# Patient Record
Sex: FEMALE
[De-identification: names, ages, dates, MRNs, and addresses within clinical notes are randomized per-mention and may not be internally consistent; named-entity substitution may affect disease eponyms.]

---

## 2018-08-27 ENCOUNTER — HOSPITAL ENCOUNTER (EMERGENCY)
Dept: HOSPITAL 31 - C.ER | Age: 53
Discharge: HOME | End: 2018-08-27
Payer: COMMERCIAL

## 2018-08-27 VITALS
TEMPERATURE: 98.7 F | DIASTOLIC BLOOD PRESSURE: 66 MMHG | OXYGEN SATURATION: 99 % | SYSTOLIC BLOOD PRESSURE: 130 MMHG | HEART RATE: 86 BPM | RESPIRATION RATE: 18 BRPM

## 2018-08-27 VITALS — BODY MASS INDEX: 26.1 KG/M2

## 2018-08-27 DIAGNOSIS — R21: Primary | ICD-10-CM

## 2018-08-27 NOTE — C.PDOC
History Of Present Illness


54 y/o female with PMH HTN presents to ED with c/o throat pain for 7 days. 

Patient also c/o rash to arms for 5 days. PT notes that she is a  

and is frequently using cleaning products and comes in contact with plants 

which she believes may have caused the rash. Occasionally uses gloves. No known 

allergens- no medication, foods or location. No one else has a similar rash. 

Denies fever, tongue/lip swelling, cough, sob or any other complaints at this 

time. 


Time Seen by Provider: 08/27/18 11:27


Chief Complaint (Nursing): Abnormal Skin Integrity


History Per: Patient, 


History/Exam Limitations: no limitations


Onset/Duration Of Symptoms: Days


Current Symptoms Are (Timing): Still Present


Quality Of Symptoms: Itching





Past Medical History


Reviewed: Historical Data, Nursing Documentation, Vital Signs


Vital Signs: 


 Last Vital Signs











Temp  98.7 F   08/27/18 11:23


 


Pulse  86   08/27/18 11:23


 


Resp  18   08/27/18 11:23


 


BP  130/66   08/27/18 11:23


 


Pulse Ox  99   08/27/18 12:21














- Medical History


PMH: HTN


Surgical History: No Surg Hx


Family History: States: No Known Family Hx





- Social History


Hx Alcohol Use: No


Hx Substance Use: No





- Immunization History


Hx Tetanus Toxoid Vaccination: No


Hx Influenza Vaccination: No


Hx Pneumococcal Vaccination: No





Review Of Systems


Constitutional: Negative for: Fever, Chills


ENT: Positive for: Throat Pain


Cardiovascular: Negative for: Chest Pain


Respiratory: Negative for: Cough, Shortness of Breath


Skin: Positive for: Rash





Physical Exam





- Physical Exam


Appears: Well, Non-toxic, No Acute Distress


Skin: Warm, Dry, Rash ((+) erythematous macular rash with some scaling on 

dorsal aspect of wrist and parts of her hands)


Head: Atraumatic, Normacephalic


Eye(s): bilateral: Normal Inspection, EOMI


Ear(s): Bilateral: Normal


Nose: Normal


Oral Mucosa: Moist


Tongue: No Swelling


Lips: No Swelling


Throat: Normal, No Erythema, No Exudate, No Drooling


Neck: Normal ROM, Supple


Chest: Symmetrical


Cardiovascular: Rhythm Regular


Respiratory: Normal Breath Sounds, No Accessory Muscle Use, No Rales, No Rhonchi

, No Wheezing


Extremity: Normal ROM


Neurological/Psych: Oriented x3, Normal Speech, Normal Cognition





ED Course And Treatment


O2 Sat by Pulse Oximetry: 99 (RA)


Progress Note: Benadryl ordered.  On re-evlaution, patient is resting 

comfortably, tolerating PO, has no shortness of breath, has no intra-oral 

swelling, no stridor. Patient notes that pruritus has improved.. Patient was 

advised to avoid potential allergens, and to follow up with physician in 1-2 

days.





Disposition





- Disposition


Disposition: HOME/ ROUTINE


Disposition Time: 12:18


Condition: STABLE


Additional Instructions: 


Vaya a hendricks mdico o la clnica en 2-5 oliva sin falta, para mas evaluacin. [place] 

los medicamentos la indicado. Volver a la liset de emergencia en cualquier 

momento si los sntomas persisten o empeoran.


Prescriptions: 


DiphenhydrAMINE [Benadryl] 25 mg PO Q6 #20 cap


Hydrocortisone 1% Cream [Cortizone 1% Cream] 1 appl TP TID #1 tube


predniSONE [Prednisone] 40 mg PO DAILY #8 tab


Instructions:  Skin Rash (DC)


Forms:  [employer] (English)


Print Language: [place]ian





- Clinical Impression


Clinical Impression: 


 Rash








- PA / NP / Resident Statement


MD/DO has reviewed & agrees with the documentation as recorded.





- Scribe Statement


The provider has reviewed the documentation as recorded by the Scribdorian Linares





All medical record entries made by the Idaibdorian were at my direction and 

personally dictated by me. I have reviewed the chart and agree that the record 

accurately reflects my personal performance of the history, physical exam, 

medical decision making, and the department course for this patient. I have 

also personally directed, reviewed, and agree with the discharge instructions 

and disposition.